# Patient Record
Sex: FEMALE | Race: WHITE | NOT HISPANIC OR LATINO | Employment: OTHER | URBAN - METROPOLITAN AREA
[De-identification: names, ages, dates, MRNs, and addresses within clinical notes are randomized per-mention and may not be internally consistent; named-entity substitution may affect disease eponyms.]

---

## 2018-07-24 ENCOUNTER — NEW PATIENT COMPREHENSIVE (OUTPATIENT)
Dept: URBAN - METROPOLITAN AREA CLINIC 46 | Facility: CLINIC | Age: 68
End: 2018-07-24

## 2018-07-24 DIAGNOSIS — H40.013: ICD-10-CM

## 2018-07-24 PROCEDURE — G8427 DOCREV CUR MEDS BY ELIG CLIN: HCPCS

## 2018-07-24 PROCEDURE — 99204 OFFICE O/P NEW MOD 45 MIN: CPT

## 2018-07-24 PROCEDURE — 92015 DETERMINE REFRACTIVE STATE: CPT

## 2018-07-24 PROCEDURE — 1036F TOBACCO NON-USER: CPT

## 2018-07-24 ASSESSMENT — VISUAL ACUITY
OD_SC: J8
OS_CC: 20/40-1
OD_PH: 20/30-1
OD_CC: J2
OS_SC: J8
OS_PH: 20/30-1
OD_BAT: 20/>400
OD_CC: 20/40-1
OS_SC: 20/50
OS_BAT: 20/>400
OD_SC: 20/40-1
OS_CC: J1

## 2018-07-24 ASSESSMENT — TONOMETRY
OS_IOP_MMHG: 18
OD_IOP_MMHG: 18

## 2019-04-11 ENCOUNTER — EST. PATIENT EMERGENCY (OUTPATIENT)
Dept: URBAN - METROPOLITAN AREA CLINIC 46 | Facility: CLINIC | Age: 69
End: 2019-04-11

## 2019-04-11 DIAGNOSIS — H43.813: ICD-10-CM

## 2019-04-11 DIAGNOSIS — H43.393: ICD-10-CM

## 2019-04-11 PROCEDURE — 92012 INTRM OPH EXAM EST PATIENT: CPT

## 2019-04-11 ASSESSMENT — TONOMETRY
OD_IOP_MMHG: 16
OS_IOP_MMHG: 16

## 2019-04-11 ASSESSMENT — VISUAL ACUITY
OD_SC: 20/70
OS_PH: 20/40
OD_PH: 20/40
OS_SC: 20/60-1

## 2019-07-30 ENCOUNTER — ESTABLISHED COMPREHENSIVE EXAM (OUTPATIENT)
Dept: URBAN - METROPOLITAN AREA CLINIC 46 | Facility: CLINIC | Age: 69
End: 2019-07-30

## 2019-07-30 DIAGNOSIS — H40.013: ICD-10-CM

## 2019-07-30 DIAGNOSIS — H25.9: ICD-10-CM

## 2019-07-30 DIAGNOSIS — H43.393: ICD-10-CM

## 2019-07-30 PROCEDURE — 92015 DETERMINE REFRACTIVE STATE: CPT

## 2019-07-30 PROCEDURE — 92133 CPTRZD OPH DX IMG PST SGM ON: CPT

## 2019-07-30 PROCEDURE — 92014 COMPRE OPH EXAM EST PT 1/>: CPT

## 2019-07-30 ASSESSMENT — TONOMETRY
OD_IOP_MMHG: 14
OS_IOP_MMHG: 14

## 2019-07-30 ASSESSMENT — VISUAL ACUITY
OS_CC: J1+
OD_SC: J5
OS_CC: 20/60-1
OD_PH: 20/25
OD_CC: 20/30-2
OD_CC: J1+
OS_SC: 20/40+2
OD_SC: 20/40-1
OS_SC: J6
OS_PH: 20/25

## 2019-09-04 NOTE — PATIENT DISCUSSION
"Recommended artificial tears to use: 1 drop 4x a day in both eyes as needed, Refresh Optive recommended brand. Advised to take Omega 3 fatty acids, like Flaxseed Oil, in supplements. 3,000 mg a day. (Takes 2 months to ""kick in."") Recommended warm compresses with microwavable eye mask 10 minutes once a day. (Takes 2 weeks to ""kick in.""). "

## 2019-12-02 ENCOUNTER — ESTABLISHED COMPREHENSIVE EXAM (OUTPATIENT)
Dept: URBAN - METROPOLITAN AREA CLINIC 46 | Facility: CLINIC | Age: 69
End: 2019-12-02

## 2019-12-02 DIAGNOSIS — H52.7: ICD-10-CM

## 2019-12-02 DIAGNOSIS — H25.9: ICD-10-CM

## 2019-12-02 PROCEDURE — 92014 COMPRE OPH EXAM EST PT 1/>: CPT

## 2019-12-02 PROCEDURE — 92015 DETERMINE REFRACTIVE STATE: CPT

## 2019-12-02 ASSESSMENT — VISUAL ACUITY
OS_CC: J1+
OD_SC: 20/70
OD_BAT: 20/>400
OS_SC: J5
OS_BAT: 20/>400
OD_SC: J10
OS_SC: 20/70
OD_CC: 20/50
OS_CC: 20/50
OD_CC: J3

## 2019-12-02 ASSESSMENT — TONOMETRY
OD_IOP_MMHG: 17
OS_IOP_MMHG: 18

## 2019-12-20 ENCOUNTER — CATARACT CONSULT (OUTPATIENT)
Dept: URBAN - METROPOLITAN AREA CLINIC 46 | Facility: CLINIC | Age: 69
End: 2019-12-20

## 2019-12-20 DIAGNOSIS — H43.813: ICD-10-CM

## 2019-12-20 DIAGNOSIS — H25.9: ICD-10-CM

## 2019-12-20 DIAGNOSIS — H40.013: ICD-10-CM

## 2019-12-20 DIAGNOSIS — H04.123: ICD-10-CM

## 2019-12-20 DIAGNOSIS — H25.813: ICD-10-CM

## 2019-12-20 PROCEDURE — 92025-3 CORNEAL TOPO, REFUSED

## 2019-12-20 PROCEDURE — 92134 CPTRZ OPH DX IMG PST SGM RTA: CPT

## 2019-12-20 PROCEDURE — 92014 COMPRE OPH EXAM EST PT 1/>: CPT

## 2019-12-20 PROCEDURE — V2799PMN IMPRIMIS PRED-MOXI-NEPAF 5ML

## 2019-12-20 PROCEDURE — 92136TC INTERFEROMETRY - TECHNICAL COMPONENT

## 2019-12-20 ASSESSMENT — TONOMETRY
OS_IOP_MMHG: 14
OD_IOP_MMHG: 15

## 2019-12-20 ASSESSMENT — VISUAL ACUITY
OS_CC: J1
OD_CC: J1
OS_BAT: 20/400
OS_SC: 20/70
OD_SC: J5
OS_CC: 20/25-2
OD_CC: 20/25-2
OD_BAT: 20/400
OD_SC: 20/70
OS_SC: J8

## 2020-01-13 ENCOUNTER — SURGERY/PROCEDURE (OUTPATIENT)
Dept: URBAN - METROPOLITAN AREA CLINIC 46 | Facility: CLINIC | Age: 70
End: 2020-01-13

## 2020-01-13 ENCOUNTER — PRE-OP/H&P (OUTPATIENT)
Dept: URBAN - METROPOLITAN AREA CLINIC 39 | Facility: CLINIC | Age: 70
End: 2020-01-13

## 2020-01-13 DIAGNOSIS — H25.813: ICD-10-CM

## 2020-01-13 DIAGNOSIS — H25.811: ICD-10-CM

## 2020-01-13 PROCEDURE — 66984 XCAPSL CTRC RMVL W/O ECP: CPT

## 2020-01-13 PROCEDURE — 99211HP H&P OFFICE/OUTPATIENT VISIT, EST

## 2020-01-14 ENCOUNTER — CATARACT POST-OP 1-DAY (OUTPATIENT)
Dept: URBAN - METROPOLITAN AREA CLINIC 46 | Facility: CLINIC | Age: 70
End: 2020-01-14

## 2020-01-14 ASSESSMENT — TONOMETRY
OD_IOP_MMHG: 38
OS_IOP_MMHG: 16

## 2020-01-14 ASSESSMENT — VISUAL ACUITY
OD_SC: J8
OS_SC: 20/50
OS_SC: J2
OD_SC: 20/40

## 2020-01-15 ENCOUNTER — POST-OP CATARACT (OUTPATIENT)
Dept: URBAN - METROPOLITAN AREA CLINIC 46 | Facility: CLINIC | Age: 70
End: 2020-01-15

## 2020-01-15 DIAGNOSIS — Z96.1: ICD-10-CM

## 2020-01-15 PROCEDURE — 99024 POSTOP FOLLOW-UP VISIT: CPT

## 2020-01-15 ASSESSMENT — TONOMETRY
OS_IOP_MMHG: 15
OD_IOP_MMHG: 14

## 2020-01-15 ASSESSMENT — VISUAL ACUITY
OD_SC: 20/40
OD_PH: 20/25
OS_SC: 20/30

## 2020-01-20 ENCOUNTER — SURGERY/PROCEDURE (OUTPATIENT)
Dept: URBAN - METROPOLITAN AREA CLINIC 46 | Facility: CLINIC | Age: 70
End: 2020-01-20

## 2020-01-20 ENCOUNTER — PREPPED CHART (OUTPATIENT)
Dept: URBAN - METROPOLITAN AREA CLINIC 39 | Facility: CLINIC | Age: 70
End: 2020-01-20

## 2020-01-20 DIAGNOSIS — H25.812: ICD-10-CM

## 2020-01-20 DIAGNOSIS — Z96.1: ICD-10-CM

## 2020-01-20 PROCEDURE — 66984 XCAPSL CTRC RMVL W/O ECP: CPT

## 2020-01-20 PROCEDURE — 99024 POSTOP FOLLOW-UP VISIT: CPT

## 2020-01-20 ASSESSMENT — VISUAL ACUITY: OD_SC: 20/25

## 2020-01-21 ENCOUNTER — CATARACT POST-OP 1-DAY (OUTPATIENT)
Dept: URBAN - METROPOLITAN AREA CLINIC 46 | Facility: CLINIC | Age: 70
End: 2020-01-21

## 2020-01-21 DIAGNOSIS — Z96.1: ICD-10-CM

## 2020-01-21 PROCEDURE — 99024 POSTOP FOLLOW-UP VISIT: CPT

## 2020-01-21 ASSESSMENT — VISUAL ACUITY
OD_SC: 20/20-1
OS_SC: J5
OD_SC: J8
OS_SC: 20/40
OU_CC: J1
OS_OTHER: FLIPPERS +2.00

## 2020-01-21 ASSESSMENT — TONOMETRY
OD_IOP_MMHG: 20
OS_IOP_MMHG: 48

## 2020-01-22 ENCOUNTER — POST-OP (OUTPATIENT)
Dept: URBAN - METROPOLITAN AREA CLINIC 46 | Facility: CLINIC | Age: 70
End: 2020-01-22

## 2020-01-22 DIAGNOSIS — Z96.1: ICD-10-CM

## 2020-01-22 PROCEDURE — 99024 POSTOP FOLLOW-UP VISIT: CPT

## 2020-01-22 ASSESSMENT — TONOMETRY
OS_IOP_MMHG: 14
OD_IOP_MMHG: 16
OS_IOP_MMHG: 10

## 2020-01-22 ASSESSMENT — VISUAL ACUITY
OS_SC: 20/30
OD_SC: 20/25-1

## 2020-01-29 ENCOUNTER — POST-OP (OUTPATIENT)
Dept: URBAN - METROPOLITAN AREA CLINIC 46 | Facility: CLINIC | Age: 70
End: 2020-01-29

## 2020-01-29 DIAGNOSIS — Z96.1: ICD-10-CM

## 2020-01-29 PROCEDURE — 99024 POSTOP FOLLOW-UP VISIT: CPT

## 2020-01-29 ASSESSMENT — VISUAL ACUITY
OD_SC: J5
OD_SC: 20/30+2
OS_SC: 20/25+2
OS_SC: J5

## 2020-01-29 ASSESSMENT — TONOMETRY
OS_IOP_MMHG: 16
OD_IOP_MMHG: 16

## 2020-02-25 ENCOUNTER — POST-OP (OUTPATIENT)
Dept: URBAN - METROPOLITAN AREA CLINIC 46 | Facility: CLINIC | Age: 70
End: 2020-02-25

## 2020-02-25 DIAGNOSIS — J30.1: ICD-10-CM

## 2020-02-25 DIAGNOSIS — Z96.1: ICD-10-CM

## 2020-02-25 PROCEDURE — 99024 POSTOP FOLLOW-UP VISIT: CPT

## 2020-02-25 ASSESSMENT — TONOMETRY
OS_IOP_MMHG: 17
OD_IOP_MMHG: 16

## 2020-02-25 ASSESSMENT — VISUAL ACUITY
OS_SC: 20/20-2
OD_SC: J8
OD_SC: 20/25-1
OS_SC: J5

## 2020-03-04 ENCOUNTER — POST-OP CATARACT (OUTPATIENT)
Dept: URBAN - METROPOLITAN AREA CLINIC 46 | Facility: CLINIC | Age: 70
End: 2020-03-04

## 2020-03-04 DIAGNOSIS — J30.1: ICD-10-CM

## 2020-03-04 DIAGNOSIS — Z96.1: ICD-10-CM

## 2020-03-04 PROCEDURE — 99024 POSTOP FOLLOW-UP VISIT: CPT

## 2020-03-04 ASSESSMENT — VISUAL ACUITY
OD_SC: J8
OS_CC: J2
OS_SC: 20/30-1
OD_BAT: 20/50
OS_BAT: 20/30
OS_SC: J10
OD_SC: 20/50
OD_CC: J1

## 2020-03-04 ASSESSMENT — TONOMETRY
OS_IOP_MMHG: 15
OD_IOP_MMHG: 19

## 2020-03-11 ENCOUNTER — POST-OP (OUTPATIENT)
Dept: URBAN - METROPOLITAN AREA CLINIC 46 | Facility: CLINIC | Age: 70
End: 2020-03-11

## 2020-03-11 DIAGNOSIS — H35.353: ICD-10-CM

## 2020-03-11 DIAGNOSIS — J30.1: ICD-10-CM

## 2020-03-11 PROCEDURE — 99024 POSTOP FOLLOW-UP VISIT: CPT

## 2020-03-11 ASSESSMENT — VISUAL ACUITY
OS_AM: 20/25
OS_SC: 20/40
OS_SC: J10
OD_RAM: 20/25
OD_SC: J8
OD_SC: 20/50

## 2020-03-16 ENCOUNTER — POST-OP (OUTPATIENT)
Dept: URBAN - METROPOLITAN AREA CLINIC 46 | Facility: CLINIC | Age: 70
End: 2020-03-16

## 2020-03-16 DIAGNOSIS — H35.353: ICD-10-CM

## 2020-03-16 DIAGNOSIS — Z96.1: ICD-10-CM

## 2020-03-16 PROCEDURE — 99024 POSTOP FOLLOW-UP VISIT: CPT

## 2020-03-16 PROCEDURE — 92134 CPTRZ OPH DX IMG PST SGM RTA: CPT

## 2020-03-16 ASSESSMENT — TONOMETRY
OD_IOP_MMHG: 17
OS_IOP_MMHG: 18

## 2020-03-16 ASSESSMENT — VISUAL ACUITY
OS_AM: 20/25
OS_SC: 20/30-2
OS_SC: J8
OD_SC: 20/40
OD_RAM: 20/25
OD_SC: J6

## 2020-03-17 ENCOUNTER — RETINA CONSULT (OUTPATIENT)
Dept: URBAN - METROPOLITAN AREA CLINIC 46 | Facility: CLINIC | Age: 70
End: 2020-03-17

## 2020-03-17 DIAGNOSIS — H35.352: ICD-10-CM

## 2020-03-17 DIAGNOSIS — H35.033: ICD-10-CM

## 2020-03-17 DIAGNOSIS — H35.351: ICD-10-CM

## 2020-03-17 PROCEDURE — 92014 COMPRE OPH EXAM EST PT 1/>: CPT

## 2020-03-17 PROCEDURE — 67515 INJECT/TREAT EYE SOCKET: CPT

## 2020-03-17 PROCEDURE — 92250 FUNDUS PHOTOGRAPHY W/I&R: CPT

## 2020-03-17 RX ORDER — BROMFENAC 0.76 MG/ML
1 SOLUTION/ DROPS OPHTHALMIC TWICE A DAY
Start: 2020-03-17

## 2020-03-17 ASSESSMENT — VISUAL ACUITY
OS_SC: 20/30+2
OD_SC: 20/30-2

## 2020-03-17 ASSESSMENT — TONOMETRY
OS_IOP_MMHG: 17
OD_IOP_MMHG: 18

## 2020-05-26 ENCOUNTER — DILATED FUNDUS EXAM (OUTPATIENT)
Dept: URBAN - METROPOLITAN AREA CLINIC 46 | Facility: CLINIC | Age: 70
End: 2020-05-26

## 2020-05-26 DIAGNOSIS — H43.813: ICD-10-CM

## 2020-05-26 DIAGNOSIS — H35.033: ICD-10-CM

## 2020-05-26 DIAGNOSIS — H35.351: ICD-10-CM

## 2020-05-26 DIAGNOSIS — H35.30: ICD-10-CM

## 2020-05-26 DIAGNOSIS — H35.352: ICD-10-CM

## 2020-05-26 PROCEDURE — 92250 FUNDUS PHOTOGRAPHY W/I&R: CPT

## 2020-05-26 PROCEDURE — 92014 COMPRE OPH EXAM EST PT 1/>: CPT

## 2020-05-26 PROCEDURE — 92134 CPTRZ OPH DX IMG PST SGM RTA: CPT

## 2020-05-26 ASSESSMENT — VISUAL ACUITY
OD_SC: 20/25
OS_SC: 20/25-1

## 2020-08-26 ENCOUNTER — ESTABLISHED COMPREHENSIVE EXAM (OUTPATIENT)
Dept: URBAN - METROPOLITAN AREA CLINIC 46 | Facility: CLINIC | Age: 70
End: 2020-08-26

## 2020-08-26 DIAGNOSIS — H52.7: ICD-10-CM

## 2020-08-26 PROCEDURE — 92015 DETERMINE REFRACTIVE STATE: CPT

## 2020-08-26 PROCEDURE — 92014 COMPRE OPH EXAM EST PT 1/>: CPT

## 2020-08-26 ASSESSMENT — VISUAL ACUITY
OD_CC: J1+
OS_BAT: 20/70-1
OS_SC: J8
OU_SC: 20/20-2
OD_SC: J6
OD_BAT: 20/70-1
OU_SC: J4
OS_CC: J1+
OD_SC: 20/25-3
OS_SC: 20/25-1

## 2020-08-26 ASSESSMENT — TONOMETRY
OD_IOP_MMHG: 17
OS_IOP_MMHG: 17

## 2020-10-02 ENCOUNTER — YAG EVALUATION (OUTPATIENT)
Dept: URBAN - METROPOLITAN AREA CLINIC 46 | Facility: CLINIC | Age: 70
End: 2020-10-02

## 2020-10-02 DIAGNOSIS — H52.7: ICD-10-CM

## 2020-10-02 DIAGNOSIS — H26.493: ICD-10-CM

## 2020-10-02 PROCEDURE — 92004 COMPRE OPH EXAM NEW PT 1/>: CPT

## 2020-10-02 ASSESSMENT — VISUAL ACUITY
OD_BAT: 20/80
OD_SC: 20/25
OS_CC: J1
OS_BAT: 20/80
OD_SC: J12
OS_SC: 20/25-1
OS_SC: J12
OD_CC: J1-

## 2020-10-02 ASSESSMENT — TONOMETRY
OD_IOP_MMHG: 16
OS_IOP_MMHG: 16

## 2020-10-14 ENCOUNTER — SURGERY/PROCEDURE (OUTPATIENT)
Dept: URBAN - METROPOLITAN AREA SURGERY 14 | Facility: SURGERY | Age: 70
End: 2020-10-14

## 2020-10-14 ENCOUNTER — PRE-OP/H&P (OUTPATIENT)
Dept: URBAN - METROPOLITAN AREA SURGERY 14 | Facility: SURGERY | Age: 70
End: 2020-10-14

## 2020-10-14 DIAGNOSIS — H52.7: ICD-10-CM

## 2020-10-14 DIAGNOSIS — H26.493: ICD-10-CM

## 2020-10-14 PROCEDURE — 99211T TECH SERVICE

## 2020-10-14 PROCEDURE — 6682150 YAG CAPSULOTOMY

## 2020-10-21 ENCOUNTER — YAG POST-OP (OUTPATIENT)
Dept: URBAN - METROPOLITAN AREA CLINIC 46 | Facility: CLINIC | Age: 70
End: 2020-10-21

## 2020-10-21 DIAGNOSIS — H26.493: ICD-10-CM

## 2020-10-21 DIAGNOSIS — H52.7: ICD-10-CM

## 2020-10-21 PROCEDURE — 99024 POSTOP FOLLOW-UP VISIT: CPT

## 2020-10-21 ASSESSMENT — TONOMETRY
OS_IOP_MMHG: 16
OD_IOP_MMHG: 16

## 2020-10-21 ASSESSMENT — VISUAL ACUITY
OS_CC: J1+
OD_SC: J10
OD_SC: 20/20-1
OD_CC: J1+
OS_SC: 20/20-2
OS_SC: J10

## 2021-01-05 NOTE — PATIENT DISCUSSION
Lotemax and Alrex and FML all over $120 a month, PA1% $65 a month, use PA 1% qd until prices of other meds decrease.

## 2021-10-22 ENCOUNTER — ESTABLISHED COMPREHENSIVE EXAM (OUTPATIENT)
Dept: URBAN - METROPOLITAN AREA CLINIC 46 | Facility: CLINIC | Age: 71
End: 2021-10-22

## 2021-10-22 DIAGNOSIS — H35.371: ICD-10-CM

## 2021-10-22 DIAGNOSIS — H52.7: ICD-10-CM

## 2021-10-22 DIAGNOSIS — H35.033: ICD-10-CM

## 2021-10-22 PROCEDURE — 92014 COMPRE OPH EXAM EST PT 1/>: CPT

## 2021-10-22 PROCEDURE — 92015 DETERMINE REFRACTIVE STATE: CPT

## 2021-10-22 PROCEDURE — 92134 CPTRZ OPH DX IMG PST SGM RTA: CPT

## 2021-10-22 ASSESSMENT — VISUAL ACUITY
OD_SC: 20/30
OS_CC: J1
OS_SC: J6
OD_SC: J12
OD_CC: J1
OS_SC: 20/25

## 2021-10-22 ASSESSMENT — TONOMETRY
OD_IOP_MMHG: 17
OS_IOP_MMHG: 21

## 2021-11-03 NOTE — PATIENT DISCUSSION
JOHN: pt on prednisolone chronically in past. would recommend switching to oasis tears and possible pataday.